# Patient Record
(demographics unavailable — no encounter records)

---

## 2024-11-06 NOTE — ASSESSMENT
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 We discussed at length surgical and non-surgical options and that non-surgical approaches are unlikely to lead to long term, sustained weight loss. We also discussed that surgery alone is unlikely to be successful but should rather be seen as a tool for weight loss to be integrated with physical activity and nutritional counseling. All risks of surgery were explained to the patient including the risks of leaks, infection, blood clots, and death. The patient verbalized understanding and agreed to proceed with the evaluation. Will begin bariatric workup with bloodwork and CT abdomen-pelvis with IV contrast to further assess the anatomy of the patient following cholecystectomy to move forward with possible bariatric surgery.

## 2024-11-06 NOTE — PHYSICAL EXAM
[Alert] : alert [No Acute Distress] : no acute distress [EOMI] : extra ocular movement intact [Normal Hearing] : hearing was normal [No Respiratory Distress] : no respiratory distress [No Accessory Muscle Use] : no accessory muscle use [Normal Rate and Effort] : normal respiratory rate and effort [Clear to Auscultation] : lungs were clear to auscultation bilaterally [Normal S1, S2] : normal S1 and S2 [Normal Rate] : heart rate was normal [No Edema] : no peripheral edema [Pedal Pulses Normal] : the pedal pulses are present [Normal Bowel Sounds] : normal bowel sounds [Not Tender] : non-tender [Not Distended] : not distended [Soft] : abdomen soft [Spine Straight] : spine straight [Normal Gait] : normal gait [Hirsutism] : hirsutism present [Oriented x3] : oriented to person, place, and time [Normal Insight/Judgement] : insight and judgment were intact [Abdominal Striae] : no abdominal striae [Acne] : no acne [de-identified] : Possible left thyroid US [de-identified] : No buffalo hump, SC fat pads, muscle wasting.

## 2024-11-06 NOTE — REVIEW OF SYSTEMS
[SOB on Exertion] : shortness of breath on exertion [Diarrhea] : diarrhea [Irregular Menses] : irregular menses [As Noted in HPI] : as noted in HPI [Acanthosis] : acanthosis [Acne] : acne [Hirsutism] : hirsutism [Negative] : Endocrine [Hair Loss] : no hair loss [Easy Bleeding] : no ~M tendency for easy bleeding [Easy Bruising] : no tendency for easy bruising [FreeTextEntry7] : multiple loose BMs daily s/p cholecystectomy [FreeTextEntry9] : Foot pain, denies bone fractures [de-identified] : Denies abdominal striae

## 2024-11-06 NOTE — ASSESSMENT
[FreeTextEntry1] : 1. Class 3 morbid obesity, with no known complications (though w/snoring, foot pain) BMI 62.35, weight 352lbs Appears clinically euthryoid, does not have Cushing's phenotype   Michael has decreased intake of soda/energy drinks and trying to increase physical activity (5000 steps daily). We discussed further diet/activity changes to make to optimize weight loss. Discussed pharmacologic options as well as bariatric surgery. She would like to proceed with seeing if injectable therapies are covered, hoping to use injectable therapy to start losing weight and then have bariatric surgery.   Plan -- Labs today to assess for other causes of obesity -- Will call with results -- Plan to start GLP-1 RA -- Bariatric surgery referral -- Nutrition referral -- Follow up in 3 months  2. Left thyroid nodule Possible on palpation Denies history of thyroid nodules, abnormal thyroid function  Plan -- Thyroid US, will call w/results -- Follow up in 3 months

## 2024-11-06 NOTE — REVIEW OF SYSTEMS
[SOB on Exertion] : shortness of breath on exertion [Diarrhea] : diarrhea [Irregular Menses] : irregular menses [As Noted in HPI] : as noted in HPI [Acanthosis] : acanthosis [Acne] : acne [Hirsutism] : hirsutism [Negative] : Endocrine [Hair Loss] : no hair loss [Easy Bleeding] : no ~M tendency for easy bleeding [Easy Bruising] : no tendency for easy bruising [FreeTextEntry7] : multiple loose BMs daily s/p cholecystectomy [FreeTextEntry9] : Foot pain, denies bone fractures [de-identified] : Denies abdominal striae

## 2024-11-06 NOTE — HISTORY OF PRESENT ILLNESS
[FreeTextEntry1] : Michael Solomon is a 21 year old female who presents to establish care for weight management/obesity.   PMHx: PCOS PSHx: cholecystectomy d/t jaundice at age 2-3 FMHx: HTN, DM  Allergies: seasonal   Presents to discuss obesity -- has dealt with obesity for most of life. Feels she is getting older, would like to have children eventually (has history of PCOS), and it has come to a point with weight that she knows she needs to make a change. Has found it difficult to navigate losing weight -- considering injectable therapies and/or metabolic surgery Highest weight is now -- 352lbs, BMI 62.35  Describes period of weight gain during pandemic as she was binge eating. Also took OCPs in September 2023 and gained 10lbs in 2 months so discontinued.  Now doing time restricted eating which she feels is helping her maintain weight but not lose weight.  She has made multiple lifestyle changes in last 3 months as well - increasing activity, cutting out soda/energy drinks, chocolate, fried foods but has not noticed weight loss  History of overweight/obesity began by age 13 Endorses childhood issues of obesity  Previously tried strategies for weight loss -- intermittent fasting (no weight loss), strict calorie counting (has helped her eat less but not lose weight) Denies history of weight loss surgery + family history of obesity  Complications of obesity: Denies SALOMON but does snore heavily. No diagnosis of OA but reports some pain in ankles, feet. Saw podiatry and was told it was because she is flatfooted. Denies preDM, HTN, HLD, CVD  PCOS  --  Reports history of irregular menstrual periods since her first period at age 14 -15.  Menstrual periods can skip 2-3 months at a time and are irregular in duration, some lasting 3 days, some lasting 5-6 days.  + had transvaginal ultrasound confirming ovarian cysts LMP -- October 23, 2024 Has occasional acne on face only that occurs with menstrual period. Endorses hirsutism (hair growth on chin) Denies hair loss Denies cardiometabolic complications of PCOS Has not tried Metformin.  Tried OCPs in 2019 and 2023 but discontinued after 2 months d/t rapid weight gain.   Diet Eats 2 meals day-- brunch and dinner.  Brunch - sandwich, ham cheese and lettuce on sliced bread  Dinner- 2 scoops of rice, 2 scoops of meat, incorporating vegetable in meal Has eliminated energy drinks, coffee Has 2 snacks/day, spicy chips and a miniature skittles or other sweet Exercise -- tries to take 5000 steps/day -- walking is main source of exercise Doesn't feel comfortable going to the gym, but trying to increase activity by stretching, moving around when watching a show  Work -- full time student, [employer], psychology Sleeps pretty well  Stress -- school work and figuring out her future  Lives with mom and 2 brothers   Current medications: Takes a generic allergy pill for seasonal allergies

## 2024-11-06 NOTE — END OF VISIT
[FreeTextEntry3] :  All medical record entries made by the Scribe were at my, CORA Rdz , direction and personally dictated by me on 11/06/2024 . I have reviewed the chart and agree that the record accurately reflects my personal performance of the history, physical exam, assessment and plan. I have also personally directed, reviewed, and agreed with the chart.  [Time Spent: ___ minutes] : I have spent [unfilled] minutes of time on the encounter which excludes teaching and separately reported services.

## 2024-11-06 NOTE — PLAN
[FreeTextEntry1] : Will begin bariatric workup with CT abdomen-pelvis with IV contrast to move forward with possible bariatric surgery

## 2024-11-06 NOTE — HISTORY OF PRESENT ILLNESS
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 We discussed at length surgical and non-surgical options and that non-surgical approaches are unlikely to lead to long term, sustained weight loss. We also discussed that surgery alone is unlikely to be successful but should rather be seen as a tool for weight loss to be integrated with physical activity and nutritional counseling. All risks of surgery were explained to the patient including the risks of leaks, infection, blood clots, and death. The patient verbalized understanding and agreed to proceed with the evaluation. Will begin bariatric workup with bloodwork and CT abdomen-pelvis with IV contrast to further assess the anatomy of the patient following cholecystectomy to move forward with possible bariatric surgery.

## 2024-11-06 NOTE — ADDENDUM
[FreeTextEntry1] : 11/6/24: Called pt to tell her about results of labs  TSH at goal, 3.10.  CMP without significant abnormalities. Lipid profile mostly at goal, HDL slightly low at 43, advised healthy diet/physical activity (continuation of plan).  A1c 5.3%, no DM, preDM.  Pending thyroid US, salivary cortisol.  We can proceed with GLP1 initiation if covered. I will prescribe Wegovy, submit for PA. Advised that she call her insurance to see if there is a need for her to enroll in weight management plan as well. Also has NPA with metabolic surgery today, too.

## 2024-11-06 NOTE — PHYSICAL EXAM
[Alert] : alert [No Acute Distress] : no acute distress [EOMI] : extra ocular movement intact [Normal Hearing] : hearing was normal [No Respiratory Distress] : no respiratory distress [No Accessory Muscle Use] : no accessory muscle use [Normal Rate and Effort] : normal respiratory rate and effort [Clear to Auscultation] : lungs were clear to auscultation bilaterally [Normal S1, S2] : normal S1 and S2 [Normal Rate] : heart rate was normal [No Edema] : no peripheral edema [Pedal Pulses Normal] : the pedal pulses are present [Normal Bowel Sounds] : normal bowel sounds [Not Tender] : non-tender [Not Distended] : not distended [Soft] : abdomen soft [Spine Straight] : spine straight [Normal Gait] : normal gait [Hirsutism] : hirsutism present [Oriented x3] : oriented to person, place, and time [Normal Insight/Judgement] : insight and judgment were intact [Abdominal Striae] : no abdominal striae [Acne] : no acne [de-identified] : Possible left thyroid US [de-identified] : No buffalo hump, SC fat pads, muscle wasting.

## 2024-11-06 NOTE — ADDENDUM
[FreeTextEntry1] :  Documented by Frances Romero acting as a scribe for CORA Loomis  on 11/06/2024  .

## 2024-11-06 NOTE — HISTORY OF PRESENT ILLNESS
[FreeTextEntry1] : Michael Solomon is a 21 year old female who presents to establish care for weight management/obesity.   PMHx: PCOS PSHx: cholecystectomy d/t jaundice at age 2-3 FMHx: HTN, DM  Allergies: seasonal   Presents to discuss obesity -- has dealt with obesity for most of life. Feels she is getting older, would like to have children eventually (has history of PCOS), and it has come to a point with weight that she knows she needs to make a change. Has found it difficult to navigate losing weight -- considering injectable therapies and/or metabolic surgery Highest weight is now -- 352lbs, BMI 62.35  Describes period of weight gain during pandemic as she was binge eating. Also took OCPs in September 2023 and gained 10lbs in 2 months so discontinued.  Now doing time restricted eating which she feels is helping her maintain weight but not lose weight.  She has made multiple lifestyle changes in last 3 months as well - increasing activity, cutting out soda/energy drinks, chocolate, fried foods but has not noticed weight loss  History of overweight/obesity began by age 13 Endorses childhood issues of obesity  Previously tried strategies for weight loss -- intermittent fasting (no weight loss), strict calorie counting (has helped her eat less but not lose weight) Denies history of weight loss surgery + family history of obesity  Complications of obesity: Denies SALOMON but does snore heavily. No diagnosis of OA but reports some pain in ankles, feet. Saw podiatry and was told it was because she is flatfooted. Denies preDM, HTN, HLD, CVD  PCOS  --  Reports history of irregular menstrual periods since her first period at age 14 -15.  Menstrual periods can skip 2-3 months at a time and are irregular in duration, some lasting 3 days, some lasting 5-6 days.  + had transvaginal ultrasound confirming ovarian cysts LMP -- October 23, 2024 Has occasional acne on face only that occurs with menstrual period. Endorses hirsutism (hair growth on chin) Denies hair loss Denies cardiometabolic complications of PCOS Has not tried Metformin.  Tried OCPs in 2019 and 2023 but discontinued after 2 months d/t rapid weight gain.   Diet Eats 2 meals day-- brunch and dinner.  Brunch - sandwich, ham cheese and lettuce on sliced bread  Dinner- 2 scoops of rice, 2 scoops of meat, incorporating vegetable in meal Has eliminated energy drinks, coffee Has 2 snacks/day, spicy chips and a miniature skittles or other sweet Exercise -- tries to take 5000 steps/day -- walking is main source of exercise Doesn't feel comfortable going to the gym, but trying to increase activity by stretching, moving around when watching a show  Work -- full time student, [employer], psychology Sleeps pretty well  Stress -- school work and figuring out her future  Lives with mom and 2 brothers   Current medications: Takes a generic allergy pill for seasonal allergies

## 2025-02-04 NOTE — ASSESSMENT
[FreeTextEntry1] : 1. Class 3 morbid obesity, with no known complications  BMI 62.35, weight 352lbs   Plan   2. Left thyroid nodule Possible on palpation Denies history of thyroid nodules, abnormal thyroid function  Plan -- Thyroid US, will call w/results -- Follow up in 3 months

## 2025-02-04 NOTE — HISTORY OF PRESENT ILLNESS
[FreeTextEntry1] : Michael Solomon is a 21 year old female who presents for follow up on weight management/obesity.   PMHx: PCOS PSHx: cholecystectomy d/t jaundice at age 2-3 FMHx: HTN, DM, obesity Allergies: seasonal    *thyroid US? followed up with bariatric surgery?  History of overweight/obesity began by age 13 Previously tried strategies for weight loss -- intermittent fasting (no weight loss), strict calorie counting (has helped her eat less but not lose weight) Complications of obesity: Snores heavily but no known SALOMON.  We planned to start GLP but it was not covered Saw bariatric surgery ***  PCOS  --  + irregular menstrual periods since first menstrual period at age 14/15 + facial acne w/menstrual period + hirsutism (hair growth on chin) + had transvaginal ultrasound confirming ovarian cysts LMP --   Has not tried Metformin.  Tried OCPs in 2019 and 2023 but discontinued after 2 months d/t rapid weight gain.   Diet Eats 2 meals day-- brunch and dinner.  Brunch - sandwich, ham cheese and lettuce on sliced bread  Dinner- 2 scoops of rice, 2 scoops of meat, incorporating vegetable in meal Has eliminated energy drinks, coffee Has 2 snacks/day, spicy chips and a miniature skittles or other sweet Exercise -- ***  Work -- full time student, [employer], psychology    Current medications: Takes a generic allergy pill for seasonal allergies

## 2025-07-09 NOTE — ASSESSMENT
[FreeTextEntry1] : Michael Solomon is a 22-year-old female returns to see me today for a follow-up visit. with history of late menarche, PCOS, probable infertility, and a history of choledochal cyst resection performed at age 3. To better define the current state of intra-abdominal anatomy and surgical history, we will order a CT scan prior to making any surgical decisions. Will follow up when results are available.

## 2025-07-09 NOTE — END OF VISIT
[Time Spent: ___ minutes] : I have spent [unfilled] minutes of time on the encounter which excludes teaching and separately reported services. [FreeTextEntry3] : All medical record entries made by the Scribe were at my, Dr. Abimael Garcia , direction and personally dictated by me on 07/09/2025 . I have reviewed the chart and agree that the record accurately reflects my personal performance of the history, physical exam, assessment and plan. I have also personally directed, reviewed, and agreed with the chart.

## 2025-07-09 NOTE — HISTORY OF PRESENT ILLNESS
[de-identified] : Michael Solomon is a 22-year-old female returns to see me today for a follow-up visit. with history of late menarche, PCOS, probable infertility, and a history of choledochal cyst resection performed at age 3. To better define the current state of intra-abdominal anatomy and surgical history, we will order a CT scan prior to making any surgical decisions. Will follow up when results are available.

## 2025-07-09 NOTE — PLAN
[FreeTextEntry1] : we will order a CT scan prior to making any final surgical decisions. Will follow up when results are available.

## 2025-07-09 NOTE — ADDENDUM
[FreeTextEntry1] :   Documented by Jaelyn Castillo acting as a scribe for Dr. Abimael Garcia  on 07/09/2025  .